# Patient Record
Sex: MALE | Race: WHITE | NOT HISPANIC OR LATINO | URBAN - METROPOLITAN AREA
[De-identification: names, ages, dates, MRNs, and addresses within clinical notes are randomized per-mention and may not be internally consistent; named-entity substitution may affect disease eponyms.]

---

## 2019-09-16 ENCOUNTER — TELEPHONE (OUTPATIENT)
Dept: FAMILY MEDICINE CLINIC | Facility: CLINIC | Age: 29
End: 2019-09-16

## 2019-09-16 NOTE — TELEPHONE ENCOUNTER
Pt was informed that if he has any problems and or difficulty breathing Dr Ogden  advises ED and or UC and can refill inhaler at appointment  Pt understood

## 2019-09-16 NOTE — TELEPHONE ENCOUNTER
----- Message from Maldonado Hernandez DO sent at 9/16/2019 10:06 AM EDT -----  Regarding: RE: new pt establich care needs inhaler  Pt  Is not yet established with our office yet and we can refill inhaler at tomorrows appt  Please get refill at last PCP, or if any problems with breathing rec ER or urgent care    ----- Message -----  From: Roosevelt Goldman MA  Sent: 9/16/2019   9:41 AM EDT  To: Maldonado Hernandez DO  Subject: new pt establich care needs inhaler              Dr Ogden pt called and is scheduled for a new pt appointment tomorrow  night at 5 pm to establish and he needs a refill on his inhaler pt was asked to please bring his inhaler along  Pt last saw a pcp in [address] in [facility]  Pt moved and has not been able to go back to [place]  Pt is not seeing a specialist and pt is only on an inhaler and he was on adderral but it has been months he has been on this script

## 2019-09-16 NOTE — TELEPHONE ENCOUNTER
Calling pt's former office in [facility] [facility] requesting last office note and med list please be faxed to our office  I informed that pt is new to our office and has an appointment tomorrow night with Dr Ogden

## 2019-09-17 ENCOUNTER — OFFICE VISIT (OUTPATIENT)
Dept: FAMILY MEDICINE CLINIC | Facility: CLINIC | Age: 29
End: 2019-09-17
Payer: COMMERCIAL

## 2019-09-17 VITALS
BODY MASS INDEX: 29.64 KG/M2 | HEART RATE: 68 BPM | DIASTOLIC BLOOD PRESSURE: 90 MMHG | SYSTOLIC BLOOD PRESSURE: 140 MMHG | HEIGHT: 75 IN | OXYGEN SATURATION: 98 % | TEMPERATURE: 98.3 F | WEIGHT: 238.4 LBS

## 2019-09-17 DIAGNOSIS — J30.2 SEASONAL ALLERGIES: ICD-10-CM

## 2019-09-17 DIAGNOSIS — J00 COLD VIRUS: ICD-10-CM

## 2019-09-17 DIAGNOSIS — Z13.220 SCREENING FOR HYPERLIPIDEMIA: ICD-10-CM

## 2019-09-17 DIAGNOSIS — F41.9 ANXIETY: ICD-10-CM

## 2019-09-17 DIAGNOSIS — F32.A DEPRESSION, UNSPECIFIED DEPRESSION TYPE: ICD-10-CM

## 2019-09-17 DIAGNOSIS — E66.3 OVERWEIGHT (BMI 25.0-29.9): ICD-10-CM

## 2019-09-17 DIAGNOSIS — J45.901 EXACERBATION OF ASTHMA, UNSPECIFIED ASTHMA SEVERITY, UNSPECIFIED WHETHER PERSISTENT: Primary | ICD-10-CM

## 2019-09-17 PROCEDURE — 3008F BODY MASS INDEX DOCD: CPT | Performed by: FAMILY MEDICINE

## 2019-09-17 PROCEDURE — 99203 OFFICE O/P NEW LOW 30 MIN: CPT | Performed by: FAMILY MEDICINE

## 2019-09-17 RX ORDER — CETIRIZINE HYDROCHLORIDE 10 MG/1
TABLET ORAL
COMMUNITY

## 2019-09-17 RX ORDER — HYDROXYZINE HYDROCHLORIDE 25 MG/1
25 TABLET, FILM COATED ORAL DAILY PRN
COMMUNITY
Start: 2017-07-13

## 2019-09-17 RX ORDER — ACYCLOVIR 50 MG/G
CREAM TOPICAL
COMMUNITY
Start: 2017-07-07

## 2019-09-17 RX ORDER — ALBUTEROL SULFATE 90 UG/1
2 AEROSOL, METERED RESPIRATORY (INHALATION) EVERY 4 HOURS PRN
Qty: 1 INHALER | Refills: 1 | Status: SHIPPED | OUTPATIENT
Start: 2019-09-17

## 2019-09-17 RX ORDER — SERTRALINE HYDROCHLORIDE 100 MG/1
100 TABLET, FILM COATED ORAL
COMMUNITY
Start: 2018-02-09

## 2019-09-17 RX ORDER — ALBUTEROL SULFATE 90 UG/1
2 AEROSOL, METERED RESPIRATORY (INHALATION)
COMMUNITY
Start: 2019-09-09 | End: 2019-09-17 | Stop reason: SDUPTHER

## 2019-09-17 RX ORDER — PREDNISONE 20 MG/1
20 TABLET ORAL
COMMUNITY
Start: 2018-07-12

## 2019-09-17 RX ORDER — PANTOPRAZOLE SODIUM 40 MG/1
40 TABLET, DELAYED RELEASE ORAL
COMMUNITY
Start: 2018-02-09

## 2019-09-17 NOTE — PATIENT INSTRUCTIONS
Here for anxiety and depression and is considering medical marijuana which he currently smokes marijuana daily  He is asthmatic and rec not smoking  Pt  To get labs as directed and consult Asthma specialist Dr Mariaa Wright  Rec flu shot yearly  He has a cold today with cough and sneezing so he will call when better  Lose weight as directed

## 2019-09-17 NOTE — PROGRESS NOTES
Assessment/Plan:  Chief Complaint   Patient presents with   Js Denney Establish Care     discuss getting refill on inhaler  pt would also like to discuss medical marijuana      Patient Instructions   Here for anxiety and depression and is considering medical marijuana which he currently smokes marijuana daily  He is asthmatic and rec not smoking  Pt  To get labs as directed and consult Asthma specialist Dr Brenda Mari  Rec flu shot yearly  He has a cold today with cough and sneezing so he will call when better  Lose weight as directed  No problem-specific Assessment & Plan notes found for this encounter  Diagnoses and all orders for this visit:    Exacerbation of asthma, unspecified asthma severity, unspecified whether persistent  -     Comprehensive metabolic panel; Future  -     CBC and differential; Future  -     Lipid Panel with Direct LDL reflex; Future  -     albuterol (PROAIR HFA) 90 mcg/act inhaler; Inhale 2 puffs every 4 (four) hours as needed for wheezing    Screening for hyperlipidemia  -     Comprehensive metabolic panel; Future  -     CBC and differential; Future  -     Lipid Panel with Direct LDL reflex; Future    Anxiety    Depression, unspecified depression type    Seasonal allergies    Cold virus    Overweight (BMI 25 0-29  9)          Subjective:      Patient ID: Torey Almodovar is a 33 y o  male  Establish Care (discuss getting refill on inhaler  pt would also like to discuss medical marijuana ) Hx of asthma and did see pulmonologist in the past and did have scar around lung  Got sick when he was a baby  Pt  has a hx of asthma and was on Advair twice daily and stopped taking it, he got used to it and stopped the medication  Hx of anxiety and depression and on no medications  Pt  Smokes marijuana daily and wants to discuss medical marijuana  Pt  Is a  and forklift manager  Has some cold symptoms today, sneezing and cough  Not  and has no children  Originally from [place]  The following portions of the patient's history were reviewed and updated as appropriate: allergies, current medications, past family history, past medical history, past social history, past surgical history and problem list     Review of Systems   Constitutional: Negative  HENT: Positive for sneezing  Eyes: Negative  Respiratory: Positive for cough and shortness of breath  Cardiovascular: Negative  Gastrointestinal: Negative  Endocrine: Negative  Genitourinary: Negative  Musculoskeletal: Negative  Skin: Negative  Allergic/Immunologic: Negative  Neurological: Negative  Hematological: Negative  Psychiatric/Behavioral: Negative  Anxiety and depression stable         Objective:      /90 (BP Location: Left arm, Patient Position: Sitting, Cuff Size: Standard)   Pulse 68   Temp 98 3 °F (36 8 °C)   Ht 6' 3 2" (1 91 m)   Wt 108 kg (238 lb 6 4 oz)   SpO2 98%   BMI 29 64 kg/m²          Physical Exam   Constitutional: He is oriented to person, place, and time  He appears well-developed and well-nourished  HENT:   Head: Normocephalic and atraumatic  Right Ear: External ear normal    Left Ear: External ear normal    Nose: Nose normal    Mouth/Throat: Oropharynx is clear and moist    sneezing   Eyes: Pupils are equal, round, and reactive to light  Conjunctivae and EOM are normal    Neck: Normal range of motion  Neck supple  Cardiovascular: Normal rate, regular rhythm, normal heart sounds and intact distal pulses  Pulmonary/Chest: Effort normal and breath sounds normal    cough   Musculoskeletal: Normal range of motion  Neurological: He is alert and oriented to person, place, and time  He has normal reflexes  Skin: Skin is warm and dry  Psychiatric: He has a normal mood and affect   His behavior is normal    Anxiety and depression

## 2019-09-17 NOTE — PROGRESS NOTES
Depression Screening Follow-up Plan: Patient's depression screening was positive with a PHQ-2 score of 6  Their PHQ-9 score was 27  Patient assessed for underlying major depression  They have no active suicidal ideations  Brief counseling provided and recommend additional follow-up/re-evaluation next office visit